# Patient Record
(demographics unavailable — no encounter records)

---

## 2024-12-02 NOTE — HISTORY OF PRESENT ILLNESS
[de-identified] : Patient is a 57F with inflammatory arthropathy, depression who presents today for annual exam 2 years on HRT, doing well with improved symptoms  Needs new rx for Cervical nerve impingement PT  Has been able to lose 10lbs on weight watchers since last visit wegovy was not covered by insurance  feels very well overall

## 2024-12-02 NOTE — HEALTH RISK ASSESSMENT
[Yes] : Yes [Monthly or less (1 pt)] : Monthly or less (1 point) [1 or 2 (0 pts)] : 1 or 2 (0 points) [Never (0 pts)] : Never (0 points) [No] : In the past 12 months have you used drugs other than those required for medical reasons? No [No falls in past year] : Patient reported no falls in the past year [0] : 2) Feeling down, depressed, or hopeless: Not at all (0) [PHQ-2 Negative - No further assessment needed] : PHQ-2 Negative - No further assessment needed [Never] : Never [NO] : No [Patient reported mammogram was normal] : Patient reported mammogram was normal [Patient reported PAP Smear was normal] : Patient reported PAP Smear was normal [Patient reported colonoscopy was abnormal] : Patient reported colonoscopy was abnormal [HIV test declined] : HIV test declined [Hepatitis C test declined] : Hepatitis C test declined [With Family] : lives with family [# of Members in Household ___] :  household currently consist of [unfilled] member(s) [Employed] : employed [Graduate School] : graduate school [] :  [# Of Children ___] : has [unfilled] children [Feels Safe at Home] : Feels safe at home [Reports normal functional visual acuity (ie: able to read med bottle)] : Reports normal functional visual acuity [Smoke Detector] : smoke detector [Carbon Monoxide Detector] : carbon monoxide detector [Seat Belt] :  uses seat belt [Sunscreen] : uses sunscreen [With Patient/Caregiver] : , with patient/caregiver [Designated Healthcare Proxy] : Designated healthcare proxy [Name: ___] : Health Care Proxy's Name: [unfilled]  [Relationship: ___] : Relationship: [unfilled] [de-identified] : rare [Audit-CScore] : 1 [de-identified] : active lifestyle  [de-identified] : improved,more fish, vegetables, cutting out sugar  [[id number]] : 0 [Reports changes in hearing] : Reports no changes in hearing [Reports changes in vision] : Reports no changes in vision [Reports changes in dental health] : Reports no changes in dental health [Safety elements used in home] : no safety elements used in home [Travel to Developing Areas] : does not  travel to developing areas [Caregiver Concerns] : does not have caregiver concerns [MammogramDate] : 06/24/2024 [PapSmearDate] : 03/13/2023 [PapSmearComments] : Dr. Roland [BoneDensityDate] : never [ColonoscopyDate] : 03/13/2024 [ColonoscopyComments] : 5 year follow up [FreeTextEntry2] : [facility]  [de-identified] : last exam 09/2024 Dr Irene Beebe-  [de-identified] : Last exam 05/2024 [AdvancecareDate] : 5/2022

## 2025-02-03 NOTE — PHYSICAL EXAM
[Respiratory Effort] : normal respiratory effort [Normal Rate and Rhythm] : normal rate and rhythm [No Rash or Lesion] : No rash or lesion [Calm] : calm [de-identified] : NAD, well-appearing  [de-identified] : anicteric [de-identified] : mildly palpable mobile left thyroid nodule, speaking in a normal voice. [de-identified] : soft, nondistended

## 2025-02-03 NOTE — HISTORY OF PRESENT ILLNESS
[de-identified] : Patient returns for follow up for multiple thyroid nodules. She underwent thyroid US recently showing mild growth of all nodules: Right spongiform nodules 1.1cm (previously 0.9) and 0.5cm (previously 0.3) and LLP nodule 2.2x1.3x1.9 (previously 1.9x1.1.1.7) TR4 that was benign on FNA in Jan 2023. She reports no new complaints. Recent TSH was normal.

## 2025-02-03 NOTE — ASSESSMENT
[FreeTextEntry1] : 58 yo F with slowly growing bilateral thyroid nodules, with dominant left thyroid nodule benign on FNA in 2023.  We discussed observation and natural history of thyroid nodules vs re-biopsy. Given prior FNA results and minimal increase in size, will continue to watch and plan for repeat FNA next year if there is persistent growth. Patient in agreement with the plan. Follow up 1 year after US.